# Patient Record
(demographics unavailable — no encounter records)

---

## 2025-01-14 NOTE — IMAGING
[Bilateral] : knee bilaterally [All Views] : anteroposterior, lateral, skyline, and anteroposterior standing [Degenerative change] : Degenerative change [FreeTextEntry9] : moderate oa Left > right. hardware intact left knee.

## 2025-01-14 NOTE — REVIEW OF SYSTEMS
[Joint Pain] : joint pain [Negative] : Heme/Lymph [Joint Stiffness] : joint stiffness [Muscle Weakness] : muscle weakness

## 2025-01-14 NOTE — DISCUSSION/SUMMARY
[Medication Risks Reviewed] : Medication risks reviewed [de-identified] : General Dx Discussion The patient was advised of the diagnosis. The natural history of the pathology was explained in full to the patient in layman's terms. All questions were answered. The risks and benefits of surgical and non-surgical treatment alternatives were explained in full to the patient.  Case Discussed. She did well with Visco injections in the past therefore recommend she repeat them. Recommend and request authorization for Euflexxa injections b/l knees.  f/u once approved.  Other otions for the future discussed including TKA     Entered by Maryuri RIOS acting as a scribe. Instructions: Dr. Marcus- The documentation recorded by the scribe accurately reflects the service I personally performed and the decisions made by me.

## 2025-01-14 NOTE — PHYSICAL EXAM
[Right] : right knee [Left] : left knee [NL (0)] : extension 0 degrees [5___] : hamstring 5[unfilled]/5 [] : no erythema [de-identified] : AP instability  [TWNoteComboBox7] : flexion 120 degrees

## 2025-01-14 NOTE — HISTORY OF PRESENT ILLNESS
[Gradual] : gradual [10] : 10 [7] : 7 [Localized] : localized [Injection therapy] : injection therapy [Retired] : Work status: retired [de-identified] : Patient here for further evaluation of cira knee pain. She c/o pain for many years. No injury or trauma. s/p PCL reconstruction many years ago. She has been treated with synvisc and Gel One injections in 2022. No catching or locking. Pain with walking and stairs.  [] : no [FreeTextEntry1] : knees [FreeTextEntry5] : chronic knee pain , no recent injury, was seeing another dr in [employer] and was getting injections and has had sx in the past  [FreeTextEntry9] : Aleve [de-identified] : bending down  [de-identified] : another ortho  [de-identified] : l knee

## 2025-01-27 NOTE — HISTORY OF PRESENT ILLNESS
[Lower back] : lower back [9] : 9 [7] : 7 [Dull/Aching] : dull/aching [Constant] : constant [Leisure] : leisure [Sleep] : sleep [Meds] : meds [Standing] : standing [Walking] : walking [Bending forward] : bending forward [Exercising] : exercising [Lying in bed] : lying in bed [FreeTextEntry1] : 2025 - Patient presents for reevaluation. Patient was last seen about 10 months ago.  Since her previous visit she has undergone a neurosurgical evaluation with Dr. John Mir.  Further conservative care was recommended and she was referred back to our practice.  Patient reports her low back pain and right hip pain remains most bothersome. Denies radicular symptoms or grain pain at this time. Low back pain is present when transitioning from sit to stand and when lifting. Pain wishes to discuss facet injections today vs BVN ablation which was previously denied by her insurance.    3/25/2024 - Patient presents for follow-up visit after right L4-L5, L5-S1 TFESI on 3/8/2024.  She reports a significant reduction of pain s/p epidural (80% overall reduction of pain), her right hip pain has been greatly reduced.  She has started formal PT. Patient still has some residual lower back and right buttock pain.   2024 - Patient presents for FUV after a right L4-5 TFESI on 2024. Patient reports a 75% reduction of pain s/p epidural, her pain is currently at the right buttock, hip and upper thigh, pain quality has dropped from sharp to aching.  Patient reports consideration for BVN ablation has been declined by insurance carrier.  2024 - The patient presents for initial evaluation regarding their low back pain. Patient was referred by Dr. Raymundo. Patient has a long-standing history of chronic lower back pain, since 2018 she has been feeling radicular pain to her hip. Currently her pain is across the lower back with radiation to the right hip and buttocks (Pain Distribution: 65% right hip, 35% lower back). She has tried PT, HEP, previous epidurals and facet blocks with no meaningful benefit; her pain is exacerbated with prolonged sitting, lifting and bending.    Injections: 1) Right L4-5 TFESI (2024) 2) Right L4-L5, L5-S1 TFESI (3/8/2024) 3) right GTB injection (2023) - Dr. Raymundo  Pertinent Surgical History: N/A   Imagin) MRI Lumbar Spine (12/10/2024) - [facility]  There is degenerative appearing endplate fatty change with Schmorl's nodes at L4-L5 (Modic type II2 change).  There is minimal degenerative appearing endplate fatty change with Schmorl's nodes anteriorly at T12-L1 as well.  Paraspinal soft tissues unremarkable. L1-L2: Central annular fissure with broad-based disc protrusion mildly narrowing the spinal canal. No foramina stenosis L2-L3: Mild disc bulge and facet arthrosis. Left foraminal zone annular fissure with disc protrusion. Mild left foraminal narrowing with impingement of the exiting left L2 nerve root. No spinal canal or right foraminal narrowing. L3-L4: Mild disc bulge. Mild facet arthrosis. No spinal canal or foramina stenosis L4-L5: Disc bulge. Moderate facet arthrosis. Left paracentral annular fissure with broad-based disc protrusion. Mild spinal canal and foraminal narrowing with disc material contacting exiting right L4 nerve root as well as bilateral descending L5 nerve roots L5-S1: Mild facet arthrosis. No disc bulge, spinal canal or foraminal narrowing  Physician Disclaimer: I have personally reviewed and confirmed all HPI data with the patient.  [] : no [FreeTextEntry7] : rt buttock  [de-identified] : eamon

## 2025-01-27 NOTE — PHYSICAL EXAM
[de-identified] : Constitutional:   - No acute distress   - Well developed; well nourished    Neurological:   - normal mood and affect   - alert and oriented x 3     Cardiovascular:   - grossly normal   Lumbar Spine Exam:   Inspection:  erythema (-)  ecchymosis (-)  rashes (-)  alignment: no scoliosis   Palpation:  Midline lumbar tenderness:            (-)  midline thoracic tenderness:          (-)  Lumbar paraspinal tenderness:  L (-) ; R (-)  thoracic paraspinal tenderness: L (-) ; R (-)  sciatic nerve tenderness :          L (-) ; R (-)  SI joint tenderness:                     L (-) ; R (-)  GTB tenderness:                        L (-);  R (-)   ROM: WNL  pain with flexion > extension  Strength:                                     Right       Left     Hip Flexion:                (5/5)       (5/5)  Quadriceps:               (5/5)       (5/5)  Hamstrings:                (5/5)       (5/5)  Ankle Dorsiflexion:     (5/5)       (5/5)  EHL:                           (5/5)       (5/5)  Ankle Plantarflexion:  (5/5)       (5/5)   Special Tests:  SLR:                            R (=) ; L (-)  Facet loading:             R (+) ; L (+)  DANIELLA test:                R (n/a) ; L (n/a)  Hamstring tightness:   R (-);  L (-)   Neurologic:  SILT throughout right lower extremity  SILT throughout left lower extremity   Reflexes normal and symmetric bilateral lower extremities   Gait:  non- antalgic gait

## 2025-01-27 NOTE — ASSESSMENT
[FreeTextEntry1] : A discussion regarding available pain management treatment options occurred with the patient.  These included interventional, rehabilitative, pharmacological, and alternative modalities. We will proceed with the following:    Interventional treatment options:   - Proceed with bilateral L4-L5, L5-S1 facet joint MBB with fluoroscopic guidance - Proceed with L4-L5 BVN ablation (Intracept procedure) with fluoroscopic guidance - Can consider repeat right L4-L5, L5-S1 TFESI with return of severe radicular pain - Failed right GTB injection with orthopedics - see additional instructions below    Rehabilitative options:   - Continue physical therapy - participation in active HEP was discussed    Medication based treatment options:   - continue OTC NSAIDs on as-needed basis - see additional instructions below    Complementary treatment options:   - lifestyle modifications discussed  Additional treatment recommendations as follows: - Follow up 1-2 weeks post injection for assessment of efficacy and further treatment recommendations                                                                                      ****Request authorization/schedule Intracept at (L4, L5)****   The risks, benefits, and alternatives to the Intracept procedure were discussed with the patient, and they understand and wish to proceed, there are no contraindications to the performance of the procedure. The patient has had LBP for more than 12 months and has failed multiple structured attempts to resolve the pain for more than 6 months, including (physical therapy, physician-directed home exercise program, lifestyle modification, posture correction, biomechanics education, NSAIDs, chiropractic care, acupuncture, massage therapy, medication).    The MRI demonstrates Modic degenerative endplate changes at (L4, L5). The patient has radiographic evidence of other pathologies besides the Modic changes, however none of them are the predominant cause of the patient's complaints.  Attempts at treating other conditions have not resolved their axial LBP, as such, the predominant cause is due to inflammation of the basivertebral nerve causing vertebrogenic LBP.  The patient's predominant physical complaint is due to Modic changes. The patient's other radiographic findings are not pertinent.   The patient has undergone careful screening by a multi-disciplinary team that includes, the (PCP, the physical therapist, chiropractor, massage therapist, our advanced practice providers) in addition to me.  The patient underwent psychological screening by me and there is no evidence of untreated substance abuse disorder.  The risks, benefits and alternatives of the proposed procedure were explained in detail with the patient. The risks outlined include but are not limited to infection, bleeding, post- dural puncture headache, nerve injury, a temporary increase in pain, failure to resolve symptoms, need for future interventions, allergic reaction, and possible elevation of blood sugar in diabetics if using corticosteroid.  All questions were answered to patient's apparent satisfaction, and he/she verbalized an understanding.  We have discussed the risks, benefits, and alternatives for NSAID therapy including but not limited to the risk of bleeding, thrombosis, gastric mucosal irritation/ulceration, allergic reaction and kidney dysfunction.  The patient verbalizes an understanding.  The documentation recorded by the scribe, in my presence, accurately reflects the service I personally performed and the decisions made by me with my edits as appropriate.   I, Heidy Patrick NP, acting as scribe, attest that this documentation has been prepared under the direction and in the presence of Provider Theron Montero DO.

## 2025-01-27 NOTE — HISTORY OF PRESENT ILLNESS
[Lower back] : lower back [9] : 9 [7] : 7 [Dull/Aching] : dull/aching [Constant] : constant [Leisure] : leisure [Sleep] : sleep [Meds] : meds [Standing] : standing [Walking] : walking [Bending forward] : bending forward [Exercising] : exercising [Lying in bed] : lying in bed [FreeTextEntry1] : 2025 - Patient presents for reevaluation. Patient was last seen about 10 months ago.  Since her previous visit she has undergone a neurosurgical evaluation with Dr. John Mir.  Further conservative care was recommended and she was referred back to our practice.  Patient reports her low back pain and right hip pain remains most bothersome. Denies radicular symptoms or grain pain at this time. Low back pain is present when transitioning from sit to stand and when lifting. Pain wishes to discuss facet injections today vs BVN ablation which was previously denied by her insurance.    3/25/2024 - Patient presents for follow-up visit after right L4-L5, L5-S1 TFESI on 3/8/2024.  She reports a significant reduction of pain s/p epidural (80% overall reduction of pain), her right hip pain has been greatly reduced.  She has started formal PT. Patient still has some residual lower back and right buttock pain.   2024 - Patient presents for FUV after a right L4-5 TFESI on 2024. Patient reports a 75% reduction of pain s/p epidural, her pain is currently at the right buttock, hip and upper thigh, pain quality has dropped from sharp to aching.  Patient reports consideration for BVN ablation has been declined by insurance carrier.  2024 - The patient presents for initial evaluation regarding their low back pain. Patient was referred by Dr. Raymundo. Patient has a long-standing history of chronic lower back pain, since 2018 she has been feeling radicular pain to her hip. Currently her pain is across the lower back with radiation to the right hip and buttocks (Pain Distribution: 65% right hip, 35% lower back). She has tried PT, HEP, previous epidurals and facet blocks with no meaningful benefit; her pain is exacerbated with prolonged sitting, lifting and bending.    Injections: 1) Right L4-5 TFESI (2024) 2) Right L4-L5, L5-S1 TFESI (3/8/2024) 3) right GTB injection (2023) - Dr. Raymundo  Pertinent Surgical History: N/A   Imagin) MRI Lumbar Spine (12/10/2024) - [facility]  There is degenerative appearing endplate fatty change with Schmorl's nodes at L4-L5 (Modic type II2 change).  There is minimal degenerative appearing endplate fatty change with Schmorl's nodes anteriorly at T12-L1 as well.  Paraspinal soft tissues unremarkable. L1-L2: Central annular fissure with broad-based disc protrusion mildly narrowing the spinal canal. No foramina stenosis L2-L3: Mild disc bulge and facet arthrosis. Left foraminal zone annular fissure with disc protrusion. Mild left foraminal narrowing with impingement of the exiting left L2 nerve root. No spinal canal or right foraminal narrowing. L3-L4: Mild disc bulge. Mild facet arthrosis. No spinal canal or foramina stenosis L4-L5: Disc bulge. Moderate facet arthrosis. Left paracentral annular fissure with broad-based disc protrusion. Mild spinal canal and foraminal narrowing with disc material contacting exiting right L4 nerve root as well as bilateral descending L5 nerve roots L5-S1: Mild facet arthrosis. No disc bulge, spinal canal or foraminal narrowing  Physician Disclaimer: I have personally reviewed and confirmed all HPI data with the patient.  [] : no [FreeTextEntry7] : rt buttock  [de-identified] : eamon

## 2025-01-27 NOTE — PHYSICAL EXAM
[de-identified] : Constitutional:   - No acute distress   - Well developed; well nourished    Neurological:   - normal mood and affect   - alert and oriented x 3     Cardiovascular:   - grossly normal   Lumbar Spine Exam:   Inspection:  erythema (-)  ecchymosis (-)  rashes (-)  alignment: no scoliosis   Palpation:  Midline lumbar tenderness:            (-)  midline thoracic tenderness:          (-)  Lumbar paraspinal tenderness:  L (-) ; R (-)  thoracic paraspinal tenderness: L (-) ; R (-)  sciatic nerve tenderness :          L (-) ; R (-)  SI joint tenderness:                     L (-) ; R (-)  GTB tenderness:                        L (-);  R (-)   ROM: WNL  pain with flexion > extension  Strength:                                     Right       Left     Hip Flexion:                (5/5)       (5/5)  Quadriceps:               (5/5)       (5/5)  Hamstrings:                (5/5)       (5/5)  Ankle Dorsiflexion:     (5/5)       (5/5)  EHL:                           (5/5)       (5/5)  Ankle Plantarflexion:  (5/5)       (5/5)   Special Tests:  SLR:                            R (=) ; L (-)  Facet loading:             R (+) ; L (+)  DANIELLA test:                R (n/a) ; L (n/a)  Hamstring tightness:   R (-);  L (-)   Neurologic:  SILT throughout right lower extremity  SILT throughout left lower extremity   Reflexes normal and symmetric bilateral lower extremities   Gait:  non- antalgic gait

## 2025-02-14 NOTE — PROCEDURE
[FreeTextEntry3] : Date of Service: 02/14/2025   Account: [id number]  Patient: AMBER SWEET   YOB: 1972  Age: 52 year  Surgeon:                  Theron Montero DO  Assistant:                None  Pre-Operative Diagnosis:   Lumbar Spondylosis      Post Operative Diagnosis:  Lumbar Spondylosis  Procedure:             Bilateral L4-5, L5-S1 facet block under fluoroscopic guidance.  Anesthesia:            MAC  This procedure was carried out using fluoroscopic guidance.  The risks and benefits of the procedure were discussed extensively with the patient.  The consent of the patient was obtained and the following procedure was performed. The patient was placed in the prone position on the fluoroscopy table and the area was prepped and draped in a sterile fashion.  A timeout was performed with all essential staff present and the site and side were verified.  The lumbar vertebral bodies were identified and the fluoroscope was obliqued ipsilateral to approximately 30 degrees to reveal the appropriate anatomical view.  The junction of the superior articulate process and transverse process at the right L4 and L5 levels were identified and marked.   The skin at these target points was then localized using 1 cc of 1% Lidocaine at each injection site.  A spinal needle was then introduced and advanced to the above target points at the junction of the SAP and transverse processes until bone was contacted.  Fluoroscope then focused on the right sacral ala on A/P view and marked at this point.  The skin and subcutaneous structures were localized using 1cc of 1.0 % lidocaine.  A spinal needle was then advanced under fluoroscopic guidance until bone was contacted at the ala.    After negative aspiration for heme and CSF 1 cc of 0.5% Marcaine was injected at each of the injection sites.  The procedure was performed in the exact same fashion on the contralateral left side at the L4, L5 and sacral ala levels.  Vital signs remained normal throughout the procedure.  The patient tolerated the procedure well.  There were no immediate complications from the performed procedure.  The patient was instructed to apply ice over the injection sites for twenty minutes every two hours for the next 24 hours.  Disposition:      1. The patient was advised to F/U in 1-2 weeks to assess the response to the injection.       2. They were advised to keep a pain diary to report the results of the diagnostic block at their FUV.      3. The patient was also instructed to contact me immediately if there were any concerns related to the procedure performed.

## 2025-03-10 NOTE — PHYSICAL EXAM
[de-identified] : Constitutional:   - No acute distress   - Well developed; well nourished    Neurological:   - normal mood and affect   - alert and oriented x 3     Cardiovascular:   - grossly normal   Lumbar Spine Exam:   Inspection:  erythema (-)  ecchymosis (-)  rashes (-)  alignment: no scoliosis   Palpation:  Midline lumbar tenderness:            (-)  midline thoracic tenderness:          (-)  Lumbar paraspinal tenderness:  L (-) ; R (-)  thoracic paraspinal tenderness: L (-) ; R (-)  sciatic nerve tenderness :          L (-) ; R (-)  SI joint tenderness:                     L (-) ; R (-)  GTB tenderness:                        L (-);  R (-)   ROM: WNL  pain with flexion > extension  Strength:                                     Right       Left     Hip Flexion:                (5/5)       (5/5)  Quadriceps:               (5/5)       (5/5)  Hamstrings:                (5/5)       (5/5)  Ankle Dorsiflexion:     (5/5)       (5/5)  EHL:                           (5/5)       (5/5)  Ankle Plantarflexion:  (5/5)       (5/5)   Special Tests:  SLR:                            R (=) ; L (-)  Facet loading:             R (+) ; L (+)  DANIELLA test:                R (n/a) ; L (n/a)  Hamstring tightness:   R (-);  L (-)   Neurologic:  SILT throughout right lower extremity  SILT throughout left lower extremity   Reflexes normal and symmetric bilateral lower extremities   Gait:  non- antalgic gait

## 2025-03-10 NOTE — ASSESSMENT
[FreeTextEntry1] : A discussion regarding available pain management treatment options occurred with the patient.  These included interventional, rehabilitative, pharmacological, and alternative modalities. We will proceed with the following:    Interventional treatment options:   - Proceed with L4-L5 BVN ablation (Intracept procedure) with fluoroscopic guidance; , scheduled 4/17/25 - May proceed with repeat bilateral L4-L5, L5-S1 facet joint MBB with ongoing axial low back pain - Can consider repeat right L4-L5, L5-S1 TFESI with return of severe radicular pain - Failed right GTB injection with orthopedics - see additional instructions below    Rehabilitative options:   - Continue physical therapy - participation in active HEP was discussed    Medication based treatment options:   - continue OTC NSAIDs on as-needed basis - see additional instructions below    Complementary treatment options:   - lifestyle modifications discussed  Additional treatment recommendations as follows: - Follow up 1-2 weeks post injection for assessment of efficacy and further treatment recommendations                                                                                      ****Request authorization/schedule Intracept at L4, L5****   The risks, benefits, and alternatives to the Intracept procedure were discussed with the patient, and they understand and wish to proceed, there are no contraindications to the performance of the procedure. The patient has had LBP for more than 12 months and has failed multiple structured attempts to resolve the pain for more than 6 months, including physical therapy, physician-directed home exercise program, lifestyle modification, posture correction, biomechanics education, NSAIDs, chiropractic care, acupuncture, massage therapy, medication.    The MRI demonstrates Modic degenerative endplate changes at L4, L5. The patient has radiographic evidence of other pathologies besides the Modic changes, however none of them are the predominant cause of the patient's complaints.  Attempts at treating other conditions have not resolved their axial LBP, as such, the predominant cause is due to inflammation of the basivertebral nerve causing vertebrogenic LBP.  The patient's predominant physical complaint is due to Modic changes. The patient's other radiographic findings are not pertinent.   The patient has undergone careful screening by a multi-disciplinary team that includes, the (PCP, the physical therapist, chiropractor, massage therapist, our advanced practice providers) in addition to me.  The patient underwent psychological screening by me and there is no evidence of untreated substance abuse disorder.  The risks, benefits and alternatives of the proposed procedure were explained in detail with the patient. The risks outlined include but are not limited to infection, bleeding, post- dural puncture headache, nerve injury, a temporary increase in pain, failure to resolve symptoms, need for future interventions, allergic reaction, and possible elevation of blood sugar in diabetics if using corticosteroid.  All questions were answered to patient's apparent satisfaction, and he/she verbalized an understanding.  We have discussed the risks, benefits, and alternatives for NSAID therapy including but not limited to the risk of bleeding, thrombosis, gastric mucosal irritation/ulceration, allergic reaction and kidney dysfunction.  The patient verbalizes an understanding.  The documentation recorded by the scribe, in my presence, accurately reflects the service I personally performed and the decisions made by me with my edits as appropriate.   I, Heidy Patrick NP, acting as scribe, attest that this documentation has been prepared under the direction and in the presence of Provider Theron Montero DO.

## 2025-03-10 NOTE — PHYSICAL EXAM
[de-identified] : Constitutional:   - No acute distress   - Well developed; well nourished    Neurological:   - normal mood and affect   - alert and oriented x 3     Cardiovascular:   - grossly normal   Lumbar Spine Exam:   Inspection:  erythema (-)  ecchymosis (-)  rashes (-)  alignment: no scoliosis   Palpation:  Midline lumbar tenderness:            (-)  midline thoracic tenderness:          (-)  Lumbar paraspinal tenderness:  L (-) ; R (-)  thoracic paraspinal tenderness: L (-) ; R (-)  sciatic nerve tenderness :          L (-) ; R (-)  SI joint tenderness:                     L (-) ; R (-)  GTB tenderness:                        L (-);  R (-)   ROM: WNL  pain with flexion > extension  Strength:                                     Right       Left     Hip Flexion:                (5/5)       (5/5)  Quadriceps:               (5/5)       (5/5)  Hamstrings:                (5/5)       (5/5)  Ankle Dorsiflexion:     (5/5)       (5/5)  EHL:                           (5/5)       (5/5)  Ankle Plantarflexion:  (5/5)       (5/5)   Special Tests:  SLR:                            R (=) ; L (-)  Facet loading:             R (+) ; L (+)  DANIELLA test:                R (n/a) ; L (n/a)  Hamstring tightness:   R (-);  L (-)   Neurologic:  SILT throughout right lower extremity  SILT throughout left lower extremity   Reflexes normal and symmetric bilateral lower extremities   Gait:  non- antalgic gait

## 2025-03-10 NOTE — HISTORY OF PRESENT ILLNESS
[Lower back] : lower back [3] : 3 [0] : 0 [Dull/Aching] : dull/aching [Intermittent] : intermittent [Household chores] : household chores [Leisure] : leisure [Injection therapy] : injection therapy [Sitting] : sitting [Standing] : standing [FreeTextEntry1] : 3/10/2025 - Patient presents for FUV after a bilateral L4-5, L5-S1 facet joint MBB on 2025. Patient reports 80% relief of axial low back pain the day of the procedure with a gradual return to baseline the following day.  Patient had a positive response with a diagnostic block for her posterior column low back pain and wishes to proceed with a repeat.  Of note, patient is scheduled for BVN ablation for treatment of anterior column low back pain 25 and is looking forward to it.  Denies any alarm signs or changes in medical history.    2025 - Patient presents for reevaluation. Patient was last seen about 10 months ago.  Since her previous visit she has undergone a neurosurgical evaluation with Dr. John Mir.  Further conservative care was recommended and she was referred back to our practice.  Patient reports her low back pain and right hip pain remains most bothersome. Denies radicular symptoms or grain pain at this time. Low back pain is present when transitioning from sit to stand and when lifting. Pain wishes to discuss facet injections today vs BVN ablation which was previously denied by her insurance.    3/25/2024 - Patient presents for follow-up visit after right L4-L5, L5-S1 TFESI on 3/8/2024.  She reports a significant reduction of pain s/p epidural (80% overall reduction of pain), her right hip pain has been greatly reduced.  She has started formal PT. Patient still has some residual lower back and right buttock pain.   2024 - Patient presents for FUV after a right L4-5 TFESI on 2024. Patient reports a 75% reduction of pain s/p epidural, her pain is currently at the right buttock, hip and upper thigh, pain quality has dropped from sharp to aching.  Patient reports consideration for BVN ablation has been declined by insurance carrier.  2024 - The patient presents for initial evaluation regarding their low back pain. Patient was referred by Dr. Raymundo. Patient has a long-standing history of chronic lower back pain, since 2018 she has been feeling radicular pain to her hip. Currently her pain is across the lower back with radiation to the right hip and buttocks (Pain Distribution: 65% right hip, 35% lower back). She has tried PT, HEP, previous epidurals and facet blocks with no meaningful benefit; her pain is exacerbated with prolonged sitting, lifting and bending.    Injections: 1) Right L4-5 TFESI (2024) 2) Right L4-L5, L5-S1 TFESI (3/8/2024) 3) right GTB injection (2023) - Dr. Raymundo 4) Bilateral L4-5, L5-S1 facet joint MBB (2025)  Pertinent Surgical History: N/A   Imagin) MRI Lumbar Spine (12/10/2024) - [facility]  There is degenerative appearing endplate fatty change with Schmorl's nodes at L4-L5 (Modic type II2 change).  There is minimal degenerative appearing endplate fatty change with Schmorl's nodes anteriorly at T12-L1 as well.  Paraspinal soft tissues unremarkable. L1-L2: Central annular fissure with broad-based disc protrusion mildly narrowing the spinal canal. No foramina stenosis L2-L3: Mild disc bulge and facet arthrosis. Left foraminal zone annular fissure with disc protrusion. Mild left foraminal narrowing with impingement of the exiting left L2 nerve root. No spinal canal or right foraminal narrowing. L3-L4: Mild disc bulge. Mild facet arthrosis. No spinal canal or foramina stenosis L4-L5: Disc bulge. Moderate facet arthrosis. Left paracentral annular fissure with broad-based disc protrusion. Mild spinal canal and foraminal narrowing with disc material contacting exiting right L4 nerve root as well as bilateral descending L5 nerve roots L5-S1: Mild facet arthrosis. No disc bulge, spinal canal or foraminal narrowing  Physician Disclaimer: I have personally reviewed and confirmed all HPI data with the patient.  [] : Post Surgical Visit: no [de-identified] : Lifting  [de-identified] : L MRI AT

## 2025-03-10 NOTE — HISTORY OF PRESENT ILLNESS
[Lower back] : lower back [3] : 3 [0] : 0 [Dull/Aching] : dull/aching [Intermittent] : intermittent [Household chores] : household chores [Leisure] : leisure [Injection therapy] : injection therapy [Sitting] : sitting [Standing] : standing [FreeTextEntry1] : 3/10/2025 - Patient presents for FUV after a bilateral L4-5, L5-S1 facet joint MBB on 2025. Patient reports 80% relief of axial low back pain the day of the procedure with a gradual return to baseline the following day.  Patient had a positive response with a diagnostic block for her posterior column low back pain and wishes to proceed with a repeat.  Of note, patient is scheduled for BVN ablation for treatment of anterior column low back pain 25 and is looking forward to it.  Denies any alarm signs or changes in medical history.    2025 - Patient presents for reevaluation. Patient was last seen about 10 months ago.  Since her previous visit she has undergone a neurosurgical evaluation with Dr. John Mir.  Further conservative care was recommended and she was referred back to our practice.  Patient reports her low back pain and right hip pain remains most bothersome. Denies radicular symptoms or grain pain at this time. Low back pain is present when transitioning from sit to stand and when lifting. Pain wishes to discuss facet injections today vs BVN ablation which was previously denied by her insurance.    3/25/2024 - Patient presents for follow-up visit after right L4-L5, L5-S1 TFESI on 3/8/2024.  She reports a significant reduction of pain s/p epidural (80% overall reduction of pain), her right hip pain has been greatly reduced.  She has started formal PT. Patient still has some residual lower back and right buttock pain.   2024 - Patient presents for FUV after a right L4-5 TFESI on 2024. Patient reports a 75% reduction of pain s/p epidural, her pain is currently at the right buttock, hip and upper thigh, pain quality has dropped from sharp to aching.  Patient reports consideration for BVN ablation has been declined by insurance carrier.  2024 - The patient presents for initial evaluation regarding their low back pain. Patient was referred by Dr. Raymundo. Patient has a long-standing history of chronic lower back pain, since 2018 she has been feeling radicular pain to her hip. Currently her pain is across the lower back with radiation to the right hip and buttocks (Pain Distribution: 65% right hip, 35% lower back). She has tried PT, HEP, previous epidurals and facet blocks with no meaningful benefit; her pain is exacerbated with prolonged sitting, lifting and bending.    Injections: 1) Right L4-5 TFESI (2024) 2) Right L4-L5, L5-S1 TFESI (3/8/2024) 3) right GTB injection (2023) - Dr. Raymundo 4) Bilateral L4-5, L5-S1 facet joint MBB (2025)  Pertinent Surgical History: N/A   Imagin) MRI Lumbar Spine (12/10/2024) - [facility]  There is degenerative appearing endplate fatty change with Schmorl's nodes at L4-L5 (Modic type II2 change).  There is minimal degenerative appearing endplate fatty change with Schmorl's nodes anteriorly at T12-L1 as well.  Paraspinal soft tissues unremarkable. L1-L2: Central annular fissure with broad-based disc protrusion mildly narrowing the spinal canal. No foramina stenosis L2-L3: Mild disc bulge and facet arthrosis. Left foraminal zone annular fissure with disc protrusion. Mild left foraminal narrowing with impingement of the exiting left L2 nerve root. No spinal canal or right foraminal narrowing. L3-L4: Mild disc bulge. Mild facet arthrosis. No spinal canal or foramina stenosis L4-L5: Disc bulge. Moderate facet arthrosis. Left paracentral annular fissure with broad-based disc protrusion. Mild spinal canal and foraminal narrowing with disc material contacting exiting right L4 nerve root as well as bilateral descending L5 nerve roots L5-S1: Mild facet arthrosis. No disc bulge, spinal canal or foraminal narrowing  Physician Disclaimer: I have personally reviewed and confirmed all HPI data with the patient.  [] : Post Surgical Visit: no [de-identified] : Lifting  [de-identified] : L MRI AT

## 2025-04-28 NOTE — HISTORY OF PRESENT ILLNESS
[Lower back] : lower back [4] : 4 [2] : 2 [Dull/Aching] : dull/aching [Intermittent] : intermittent [Household chores] : household chores [Leisure] : leisure [Ice] : ice [Heat] : heat [Injection therapy] : injection therapy [FreeTextEntry1] : 2025 - Patient presents today for a FUV after L4-L5 BVN Ablation (Intracept) procedure on 2025, Patient reports overall 80% reduction in axial low back pain since the procedure and she is very pleased with the results at this time.  She reports her sleep has improved significantly as well as her discomfort when transitioning from sit to stand positioning or getting in/out of her car. Patient reports still remaining cautious with bending/lifting and therefore is unable to gauge her relief of symptoms fully at this time.  Patient is looking forward to beginning physical therapy and engaging in physical activity again. Presents today for wound check and to discuss next steps in her treatment plan.    3/10/2025 - Patient presents for FUV after a bilateral L4-5, L5-S1 facet joint MBB on 2025. Patient reports 80% relief of axial low back pain the day of the procedure with a gradual return to baseline the following day.  Patient had a positive response with a diagnostic block for her posterior column low back pain and wishes to proceed with a repeat.  Of note, patient is scheduled for BVN ablation for treatment of anterior column low back pain 25 and is looking forward to it.  Denies any alarm signs or changes in medical history.    2025 - Patient presents for reevaluation. Patient was last seen about 10 months ago.  Since her previous visit she has undergone a neurosurgical evaluation with Dr. John Mir.  Further conservative care was recommended and she was referred back to our practice.  Patient reports her low back pain and right hip pain remains most bothersome. Denies radicular symptoms or grain pain at this time. Low back pain is present when transitioning from sit to stand and when lifting. Pain wishes to discuss facet injections today vs BVN ablation which was previously denied by her insurance.    3/25/2024 - Patient presents for follow-up visit after right L4-L5, L5-S1 TFESI on 3/8/2024.  She reports a significant reduction of pain s/p epidural (80% overall reduction of pain), her right hip pain has been greatly reduced.  She has started formal PT. Patient still has some residual lower back and right buttock pain.   2024 - Patient presents for FUV after a right L4-5 TFESI on 2024. Patient reports a 75% reduction of pain s/p epidural, her pain is currently at the right buttock, hip and upper thigh, pain quality has dropped from sharp to aching.  Patient reports consideration for BVN ablation has been declined by insurance carrier.  2024 - The patient presents for initial evaluation regarding their low back pain. Patient was referred by Dr. Raymundo. Patient has a long-standing history of chronic lower back pain, since 2018 she has been feeling radicular pain to her hip. Currently her pain is across the lower back with radiation to the right hip and buttocks (Pain Distribution: 65% right hip, 35% lower back). She has tried PT, HEP, previous epidurals and facet blocks with no meaningful benefit; her pain is exacerbated with prolonged sitting, lifting and bending.    Injections: 1) Right L4-5 TFESI (2024) 2) Right L4-L5, L5-S1 TFESI (3/8/2024) 3) right GTB injection (2023) - Dr. Raymundo 4) Bilateral L4-5, L5-S1 facet joint MBB (2025) 5) BVN Ablation (Intracept) L4-L5 Levels (2025)  Pertinent Surgical History: N/A   Imagin) MRI Lumbar Spine (12/10/2024) - [facility]  There is degenerative appearing endplate fatty change with Schmorl's nodes at L4-L5 (Modic type II2 change).  There is minimal degenerative appearing endplate fatty change with Schmorl's nodes anteriorly at T12-L1 as well.  Paraspinal soft tissues unremarkable. L1-L2: Central annular fissure with broad-based disc protrusion mildly narrowing the spinal canal. No foramina stenosis L2-L3: Mild disc bulge and facet arthrosis. Left foraminal zone annular fissure with disc protrusion. Mild left foraminal narrowing with impingement of the exiting left L2 nerve root. No spinal canal or right foraminal narrowing. L3-L4: Mild disc bulge. Mild facet arthrosis. No spinal canal or foramina stenosis L4-L5: Disc bulge. Moderate facet arthrosis. Left paracentral annular fissure with broad-based disc protrusion. Mild spinal canal and foraminal narrowing with disc material contacting exiting right L4 nerve root as well as bilateral descending L5 nerve roots L5-S1: Mild facet arthrosis. No disc bulge, spinal canal or foraminal narrowing  Physician Disclaimer: I have personally reviewed and confirmed all HPI data with the patient.  [] : Post Surgical Visit: no [de-identified] : No triggers since injection  [de-identified] : L MRI AT

## 2025-04-28 NOTE — ASSESSMENT
[FreeTextEntry1] : A discussion regarding available pain management treatment options occurred with the patient.  These included interventional, rehabilitative, pharmacological, and alternative modalities. We will proceed with the following:    Interventional treatment options:   - None indicated at this time - Patient s/p L4-L5 BVN ablation (Intracept procedure); doing well - May proceed with repeat bilateral L4-L5, L5-S1 facet joint MBB with ongoing axial low back pain - Can consider repeat right L4-L5, L5-S1 TFESI with return of severe radicular pain - Failed right GTB injection with orthopedics - see additional instructions below    Rehabilitative options:   - Initiate physical therapy s/p Intracept procedure; referral provided today - participation in active HEP was discussed    Medication based treatment options:   - continue OTC NSAIDs on as-needed basis - see additional instructions below    Complementary treatment options:   - lifestyle modifications discussed  Additional treatment recommendations as follows: - Follow up 6-8 weeks or as needed basis                             We have discussed the risks, benefits, and alternatives for NSAID therapy including but not limited to the risk of bleeding, thrombosis, gastric mucosal irritation/ulceration, allergic reaction and kidney dysfunction.  The patient verbalizes an understanding.  The documentation recorded by the scribe, in my presence, accurately reflects the service I personally performed and the decisions made by me with my edits as appropriate.   I, Heidy Patrick NP, acting as scribe, attest that this documentation has been prepared under the direction and in the presence of Provider Theron Montero DO.

## 2025-04-28 NOTE — HISTORY OF PRESENT ILLNESS
[Lower back] : lower back [4] : 4 [2] : 2 [Dull/Aching] : dull/aching 24-Jun-2020 09:20 [Intermittent] : intermittent [Household chores] : household chores [Leisure] : leisure [Ice] : ice [Heat] : heat [Injection therapy] : injection therapy [FreeTextEntry1] : 2025 - Patient presents today for a FUV after L4-L5 BVN Ablation (Intracept) procedure on 2025, Patient reports overall 80% reduction in axial low back pain since the procedure and she is very pleased with the results at this time.  She reports her sleep has improved significantly as well as her discomfort when transitioning from sit to stand positioning or getting in/out of her car. Patient reports still remaining cautious with bending/lifting and therefore is unable to gauge her relief of symptoms fully at this time.  Patient is looking forward to beginning physical therapy and engaging in physical activity again. Presents today for wound check and to discuss next steps in her treatment plan.    3/10/2025 - Patient presents for FUV after a bilateral L4-5, L5-S1 facet joint MBB on 2025. Patient reports 80% relief of axial low back pain the day of the procedure with a gradual return to baseline the following day.  Patient had a positive response with a diagnostic block for her posterior column low back pain and wishes to proceed with a repeat.  Of note, patient is scheduled for BVN ablation for treatment of anterior column low back pain 25 and is looking forward to it.  Denies any alarm signs or changes in medical history.    2025 - Patient presents for reevaluation. Patient was last seen about 10 months ago.  Since her previous visit she has undergone a neurosurgical evaluation with Dr. John Mir.  Further conservative care was recommended and she was referred back to our practice.  Patient reports her low back pain and right hip pain remains most bothersome. Denies radicular symptoms or grain pain at this time. Low back pain is present when transitioning from sit to stand and when lifting. Pain wishes to discuss facet injections today vs BVN ablation which was previously denied by her insurance.    3/25/2024 - Patient presents for follow-up visit after right L4-L5, L5-S1 TFESI on 3/8/2024.  She reports a significant reduction of pain s/p epidural (80% overall reduction of pain), her right hip pain has been greatly reduced.  She has started formal PT. Patient still has some residual lower back and right buttock pain.   2024 - Patient presents for FUV after a right L4-5 TFESI on 2024. Patient reports a 75% reduction of pain s/p epidural, her pain is currently at the right buttock, hip and upper thigh, pain quality has dropped from sharp to aching.  Patient reports consideration for BVN ablation has been declined by insurance carrier.  2024 - The patient presents for initial evaluation regarding their low back pain. Patient was referred by Dr. Raymundo. Patient has a long-standing history of chronic lower back pain, since 2018 she has been feeling radicular pain to her hip. Currently her pain is across the lower back with radiation to the right hip and buttocks (Pain Distribution: 65% right hip, 35% lower back). She has tried PT, HEP, previous epidurals and facet blocks with no meaningful benefit; her pain is exacerbated with prolonged sitting, lifting and bending.    Injections: 1) Right L4-5 TFESI (2024) 2) Right L4-L5, L5-S1 TFESI (3/8/2024) 3) right GTB injection (2023) - Dr. Raymundo 4) Bilateral L4-5, L5-S1 facet joint MBB (2025) 5) BVN Ablation (Intracept) L4-L5 Levels (2025)  Pertinent Surgical History: N/A   Imagin) MRI Lumbar Spine (12/10/2024) - [facility]  There is degenerative appearing endplate fatty change with Schmorl's nodes at L4-L5 (Modic type II2 change).  There is minimal degenerative appearing endplate fatty change with Schmorl's nodes anteriorly at T12-L1 as well.  Paraspinal soft tissues unremarkable. L1-L2: Central annular fissure with broad-based disc protrusion mildly narrowing the spinal canal. No foramina stenosis L2-L3: Mild disc bulge and facet arthrosis. Left foraminal zone annular fissure with disc protrusion. Mild left foraminal narrowing with impingement of the exiting left L2 nerve root. No spinal canal or right foraminal narrowing. L3-L4: Mild disc bulge. Mild facet arthrosis. No spinal canal or foramina stenosis L4-L5: Disc bulge. Moderate facet arthrosis. Left paracentral annular fissure with broad-based disc protrusion. Mild spinal canal and foraminal narrowing with disc material contacting exiting right L4 nerve root as well as bilateral descending L5 nerve roots L5-S1: Mild facet arthrosis. No disc bulge, spinal canal or foraminal narrowing  Physician Disclaimer: I have personally reviewed and confirmed all HPI data with the patient.  [] : Post Surgical Visit: no [de-identified] : No triggers since injection  [de-identified] : L MRI AT

## 2025-04-28 NOTE — PHYSICAL EXAM
[de-identified] : Constitutional:   - No acute distress   - Well developed; well nourished    Neurological:   - normal mood and affect   - alert and oriented x 3     Cardiovascular:   - grossly normal   Lumbar Spine Exam:   Inspection:  erythema (-)  ecchymosis (-)  rashes (-)  alignment: no scoliosis  BVN ablation trocar site CDI without redness, edema or exudate  Palpation:  Midline lumbar tenderness:            (-)  midline thoracic tenderness:          (-)  Lumbar paraspinal tenderness:  L (-) ; R (-)  thoracic paraspinal tenderness: L (-) ; R (-)  sciatic nerve tenderness :          L (-) ; R (-)  SI joint tenderness:                     L (-) ; R (-)  GTB tenderness:                        L (-);  R (-)   ROM: WNL  pain with extremes of flexion > extension  Strength:                                     Right       Left     Hip Flexion:                (5/5)       (5/5)  Quadriceps:               (5/5)       (5/5)  Hamstrings:                (5/5)       (5/5)  Ankle Dorsiflexion:     (5/5)       (5/5)  EHL:                           (5/5)       (5/5)  Ankle Plantarflexion:  (5/5)       (5/5)   Special Tests:  SLR:                            R (-) ; L (-)  Facet loading:             R (-) ; L (-)  DANIELLA test:                R (n/a) ; L (n/a)  Hamstring tightness:   R (-);  L (-)   Neurologic:  SILT throughout right lower extremity  SILT throughout left lower extremity   Reflexes normal and symmetric bilateral lower extremities   Gait:  non- antalgic gait

## 2025-04-28 NOTE — PHYSICAL EXAM
[de-identified] : Constitutional:   - No acute distress   - Well developed; well nourished    Neurological:   - normal mood and affect   - alert and oriented x 3     Cardiovascular:   - grossly normal   Lumbar Spine Exam:   Inspection:  erythema (-)  ecchymosis (-)  rashes (-)  alignment: no scoliosis  BVN ablation trocar site CDI without redness, edema or exudate  Palpation:  Midline lumbar tenderness:            (-)  midline thoracic tenderness:          (-)  Lumbar paraspinal tenderness:  L (-) ; R (-)  thoracic paraspinal tenderness: L (-) ; R (-)  sciatic nerve tenderness :          L (-) ; R (-)  SI joint tenderness:                     L (-) ; R (-)  GTB tenderness:                        L (-);  R (-)   ROM: WNL  pain with extremes of flexion > extension  Strength:                                     Right       Left     Hip Flexion:                (5/5)       (5/5)  Quadriceps:               (5/5)       (5/5)  Hamstrings:                (5/5)       (5/5)  Ankle Dorsiflexion:     (5/5)       (5/5)  EHL:                           (5/5)       (5/5)  Ankle Plantarflexion:  (5/5)       (5/5)   Special Tests:  SLR:                            R (-) ; L (-)  Facet loading:             R (-) ; L (-)  DANIELLA test:                R (n/a) ; L (n/a)  Hamstring tightness:   R (-);  L (-)   Neurologic:  SILT throughout right lower extremity  SILT throughout left lower extremity   Reflexes normal and symmetric bilateral lower extremities   Gait:  non- antalgic gait

## 2025-04-29 NOTE — DISCUSSION/SUMMARY
[Medication Risks Reviewed] : Medication risks reviewed [de-identified] : General Dx Discussion The patient was advised of the diagnosis. The natural history of the pathology was explained in full to the patient in layman's terms. All questions were answered. The risks and benefits of surgical and non-surgical treatment alternatives were explained in full to the patient.  Case Discussed. Visco3 injections today b/l knees and tolerated well.  Ice as needed. f/u 1 week to continue series.     Entered by Maryuri RIOS acting as a scribe. Instructions: Dr. Marcus- The documentation recorded by the scribe accurately reflects the service I personally performed and the decisions made by me.

## 2025-04-29 NOTE — PHYSICAL EXAM
[Right] : right knee [Left] : left knee [NL (0)] : extension 0 degrees [5___] : hamstring 5[unfilled]/5 [] : patient ambulates without assistive device [de-identified] : AP instability  [TWNoteComboBox7] : flexion 120 degrees

## 2025-04-29 NOTE — HISTORY OF PRESENT ILLNESS
[9] : 9 [7] : 7 [Retired] : Work status: retired [] : This patient has had an injection before: yes [de-identified] : Here for f/u knees.  [de-identified] : no

## 2025-04-29 NOTE — PROCEDURE
----- Message from Phil Ro Large Joint Injection] : Large joint injection [Bilateral] : bilaterally of the [Knee] : knee [Pain] : pain [Inflammation] : inflammation [X-ray evidence of Osteoarthritis on this or prior visit] : x-ray evidence of Osteoarthritis on this or prior visit [Alcohol] : alcohol [Betadine] : betadine [Ethyl Chloride sprayed topically] : ethyl chloride sprayed topically [Sterile technique used] : sterile technique used [Visco-3 (25mg)] : 25mg of Visco-3 [#1] : series #1 [] : Patient tolerated procedure well [Call if redness, pain or fever occur] : call if redness, pain or fever occur [Apply ice for 15min out of every hour for the next 12-24 hours as tolerated] : apply ice for 15 minutes out of every hour for the next 12-24 hours as tolerated [Previous OTC use and PT nontherapeutic] : patient has tried OTC's including aspirin, Ibuprofen, Aleve, etc or prescription NSAIDS, and/or exercises at home and/or physical therapy without satisfactory response [Patient had decreased mobility in the joint] : patient had decreased mobility in the joint [Risks, benefits, alternatives discussed / Verbal consent obtained] : the risks benefits, and alternatives have been discussed, and verbal consent was obtained [Prior failure or difficult injection] : prior failure or difficult injection [Altered anatomic landmarks d/t erosive arthritis] : altered anatomic landmarks d/t erosive arthritis [All ultrasound images have been permanently captured and stored accordingly in our picture archiving and communication system] : All ultrasound images have been permanently captured and stored accordingly in our picture archiving and communication system

## 2025-05-06 NOTE — HISTORY OF PRESENT ILLNESS
[Retired] : Work status: retired [2] : 2 [Other:____] : [unfilled] [0] : 0 [Localized] : localized [Rest] : rest [de-identified] : Here for f/u knees.  [] : no [FreeTextEntry1] : knees [de-identified] : squats in PT [de-identified] : for the back [de-identified] : 4-29-25 [de-identified] : knees

## 2025-05-06 NOTE — DISCUSSION/SUMMARY
[Medication Risks Reviewed] : Medication risks reviewed [de-identified] : General Dx Discussion The patient was advised of the diagnosis. The natural history of the pathology was explained in full to the patient in layman's terms. All questions were answered. The risks and benefits of surgical and non-surgical treatment alternatives were explained in full to the patient.  Case Discussed. Visco3 injections today b/l knees and tolerated well.  Ice as needed. f/u 1 week to continue series.     Entered by Maryuri RIOS acting as a scribe. Instructions: Dr. Marcus- The documentation recorded by the scribe accurately reflects the service I personally performed and the decisions made by me.

## 2025-05-06 NOTE — PROCEDURE
[Large Joint Injection] : Large joint injection [Bilateral] : bilaterally of the [Knee] : knee [Pain] : pain [Inflammation] : inflammation [X-ray evidence of Osteoarthritis on this or prior visit] : x-ray evidence of Osteoarthritis on this or prior visit [Alcohol] : alcohol [Betadine] : betadine [Ethyl Chloride sprayed topically] : ethyl chloride sprayed topically [Sterile technique used] : sterile technique used [Visco-3 (25mg)] : 25mg of Visco-3 [#2] : series #2 [] : Patient tolerated procedure well [Call if redness, pain or fever occur] : call if redness, pain or fever occur [Apply ice for 15min out of every hour for the next 12-24 hours as tolerated] : apply ice for 15 minutes out of every hour for the next 12-24 hours as tolerated [Previous OTC use and PT nontherapeutic] : patient has tried OTC's including aspirin, Ibuprofen, Aleve, etc or prescription NSAIDS, and/or exercises at home and/or physical therapy without satisfactory response [Patient had decreased mobility in the joint] : patient had decreased mobility in the joint [Risks, benefits, alternatives discussed / Verbal consent obtained] : the risks benefits, and alternatives have been discussed, and verbal consent was obtained [Prior failure or difficult injection] : prior failure or difficult injection [Altered anatomic landmarks d/t erosive arthritis] : altered anatomic landmarks d/t erosive arthritis [All ultrasound images have been permanently captured and stored accordingly in our picture archiving and communication system] : All ultrasound images have been permanently captured and stored accordingly in our picture archiving and communication system

## 2025-05-06 NOTE — PHYSICAL EXAM
[Right] : right knee [Left] : left knee [NL (0)] : extension 0 degrees [5___] : hamstring 5[unfilled]/5 [] : patient ambulates without assistive device [de-identified] : AP instability  [TWNoteComboBox7] : flexion 120 degrees

## 2025-05-13 NOTE — PHYSICAL EXAM
[Right] : right knee [Left] : left knee [NL (0)] : extension 0 degrees [5___] : hamstring 5[unfilled]/5 [] : no erythema [de-identified] : AP instability  [TWNoteComboBox7] : flexion 120 degrees

## 2025-05-13 NOTE — HISTORY OF PRESENT ILLNESS
[3] : 3 [Other:____] : [unfilled] [5] : 5 [Dull/Aching] : dull/aching [Localized] : localized [Tightness] : tightness [Nothing helps with pain getting better] : Nothing helps with pain getting better [de-identified] : Here for f/u knees.  [] : no [FreeTextEntry1] : knees [de-identified] : cannot bend [de-identified] : 5-6-25 [de-identified] : knees

## 2025-05-13 NOTE — DISCUSSION/SUMMARY
[Medication Risks Reviewed] : Medication risks reviewed [de-identified] : General Dx Discussion The patient was advised of the diagnosis. The natural history of the pathology was explained in full to the patient in layman's terms. All questions were answered. The risks and benefits of surgical and non-surgical treatment alternatives were explained in full to the patient.  Case Discussed. Visco3 injections today b/l knees and tolerated well.  Ice as needed. f/u 6 weeks.     Entered by Maryuri RIOS acting as a scribe. Instructions: Dr. Marcus- The documentation recorded by the scribe accurately reflects the service I personally performed and the decisions made by me.

## 2025-05-13 NOTE — PROCEDURE
[Large Joint Injection] : Large joint injection [Bilateral] : bilaterally of the [Knee] : knee [Pain] : pain [Inflammation] : inflammation [X-ray evidence of Osteoarthritis on this or prior visit] : x-ray evidence of Osteoarthritis on this or prior visit [Alcohol] : alcohol [Betadine] : betadine [Ethyl Chloride sprayed topically] : ethyl chloride sprayed topically [Sterile technique used] : sterile technique used [Visco-3 (25mg)] : 25mg of Visco-3 [#3] : series #3 [] : Patient tolerated procedure well [Call if redness, pain or fever occur] : call if redness, pain or fever occur [Apply ice for 15min out of every hour for the next 12-24 hours as tolerated] : apply ice for 15 minutes out of every hour for the next 12-24 hours as tolerated [Previous OTC use and PT nontherapeutic] : patient has tried OTC's including aspirin, Ibuprofen, Aleve, etc or prescription NSAIDS, and/or exercises at home and/or physical therapy without satisfactory response [Patient had decreased mobility in the joint] : patient had decreased mobility in the joint [Risks, benefits, alternatives discussed / Verbal consent obtained] : the risks benefits, and alternatives have been discussed, and verbal consent was obtained [Prior failure or difficult injection] : prior failure or difficult injection [Altered anatomic landmarks d/t erosive arthritis] : altered anatomic landmarks d/t erosive arthritis [All ultrasound images have been permanently captured and stored accordingly in our picture archiving and communication system] : All ultrasound images have been permanently captured and stored accordingly in our picture archiving and communication system

## 2025-06-23 NOTE — HISTORY OF PRESENT ILLNESS
[Lower back] : lower back [5] : 5 [3] : 3 [Dull/Aching] : dull/aching [Constant] : constant [Household chores] : household chores [Leisure] : leisure [Sleep] : sleep [Injection therapy] : injection therapy [Bending forward] : bending forward [FreeTextEntry1] : 2025 - Patient presents for 8-week follow-up visit. Patient continues to report significant pain relief of axial low back pain and a significant increase in functionality s/p BVN ablation. She has completed several PT sessions for the low back and now has continued a HEP with exercises learned daily with benefit. Most bothersome area of pain remains right buttocks and hip.  Pain is located to the buttocks with radiation to the lateral hip and anterior thigh.  Pain is present with stretching, going up and downstairs and with increased activity.  Patient has previously tried and failed GTB and hip joint injections as well as PT dedicated to the right hip. Denies any alarm signs or changes in medical history since last office visit.  Presents today to discuss next steps in her treatment plan with the hope of decreasing her pain and increasing her functionality and quality of life.   2025 - Patient presents today for a FUV after L4-L5 BVN Ablation (Intracept) procedure on 2025, Patient reports overall 80% reduction in axial low back pain since the procedure and she is very pleased with the results at this time.  She reports her sleep has improved significantly as well as her discomfort when transitioning from sit to stand positioning or getting in/out of her car. Patient reports still remaining cautious with bending/lifting and therefore is unable to gauge her relief of symptoms fully at this time.  Patient is looking forward to beginning physical therapy and engaging in physical activity again. Presents today for wound check and to discuss next steps in her treatment plan.    3/10/2025 - Patient presents for FUV after a bilateral L4-5, L5-S1 facet joint MBB on 2025. Patient reports 80% relief of axial low back pain the day of the procedure with a gradual return to baseline the following day.  Patient had a positive response with a diagnostic block for her posterior column low back pain and wishes to proceed with a repeat.  Of note, patient is scheduled for BVN ablation for treatment of anterior column low back pain 25 and is looking forward to it.  Denies any alarm signs or changes in medical history.    2025 - Patient presents for reevaluation. Patient was last seen about 10 months ago.  Since her previous visit she has undergone a neurosurgical evaluation with Dr. John Mir.  Further conservative care was recommended, and she was referred back to our practice.  Patient reports her low back pain and right hip pain remains most bothersome. Denies radicular symptoms or grain pain at this time. Low back pain is present when transitioning from sit to stand and when lifting. Pain wishes to discuss facet injections today vs BVN ablation which was previously denied by her insurance.    3/25/2024 - Patient presents for follow-up visit after right L4-L5, L5-S1 TFESI on 3/8/2024.  She reports a significant reduction of pain s/p epidural (80% overall reduction of pain), her right hip pain has been greatly reduced.  She has started formal PT. Patient still has some residual lower back and right buttock pain.   2024 - Patient presents for FUV after a right L4-5 TFESI on 2024. Patient reports a 75% reduction of pain s/p epidural, her pain is currently at the right buttock, hip and upper thigh, pain quality has dropped from sharp to aching.  Patient reports consideration for BVN ablation has been declined by insurance carrier.  2024 - The patient presents for initial evaluation regarding their low back pain. Patient was referred by Dr. Raymundo. Patient has a long-standing history of chronic lower back pain, since 2018 she has been feeling radicular pain to her hip. Currently her pain is across the lower back with radiation to the right hip and buttocks (Pain Distribution: 65% right hip, 35% lower back). She has tried PT, HEP, previous epidurals and facet blocks with no meaningful benefit; her pain is exacerbated with prolonged sitting, lifting and bending.    Injections: 1) Right L4-5 TFESI (2024) 2) Right L4-L5, L5-S1 TFESI (3/8/2024) 3) right GTB injection (2023) - Dr. Raymundo 4) Bilateral L4-5, L5-S1 facet joint MBB - (2025) 5) L4-L5 BVN Ablation (Intracept procedure) - (2025)  Pertinent Surgical History: N/A   Imagin) MRI Lumbar Spine (12/10/2024) - [facility]  There is degenerative appearing endplate fatty change with Schmorl's nodes at L4-L5 (Modic type II2 change).  There is minimal degenerative appearing endplate fatty change with Schmorl's nodes anteriorly at T12-L1 as well.  Paraspinal soft tissues unremarkable. L1-L2: Central annular fissure with broad-based disc protrusion mildly narrowing the spinal canal. No foramina stenosis L2-L3: Mild disc bulge and facet arthrosis. Left foraminal zone annular fissure with disc protrusion. Mild left foraminal narrowing with impingement of the exiting left L2 nerve root. No spinal canal or right foraminal narrowing. L3-L4: Mild disc bulge. Mild facet arthrosis. No spinal canal or foramina stenosis L4-L5: Disc bulge. Moderate facet arthrosis. Left paracentral annular fissure with broad-based disc protrusion. Mild spinal canal and foraminal narrowing with disc material contacting exiting right L4 nerve root as well as bilateral descending L5 nerve roots L5-S1: Mild facet arthrosis. No disc bulge, spinal canal or foraminal narrowing  Physician Disclaimer: I have personally reviewed and confirmed all HPI data with the patient.  [] : Post Surgical Visit: no [de-identified] : MRI Lumbar Spine (12/10/2024) - [facility]

## 2025-06-23 NOTE — HISTORY OF PRESENT ILLNESS
[Lower back] : lower back [5] : 5 [3] : 3 [Dull/Aching] : dull/aching [Constant] : constant [Household chores] : household chores [Leisure] : leisure [Sleep] : sleep [Injection therapy] : injection therapy [Bending forward] : bending forward [FreeTextEntry1] : 2025 - Patient presents for 8-week follow-up visit. Patient continues to report significant pain relief of axial low back pain and a significant increase in functionality s/p BVN ablation. She has completed several PT sessions for the low back and now has continued a HEP with exercises learned daily with benefit. Most bothersome area of pain remains right buttocks and hip.  Pain is located to the buttocks with radiation to the lateral hip and anterior thigh.  Pain is present with stretching, going up and downstairs and with increased activity.  Patient has previously tried and failed GTB and hip joint injections as well as PT dedicated to the right hip. Denies any alarm signs or changes in medical history since last office visit.  Presents today to discuss next steps in her treatment plan with the hope of decreasing her pain and increasing her functionality and quality of life.   2025 - Patient presents today for a FUV after L4-L5 BVN Ablation (Intracept) procedure on 2025, Patient reports overall 80% reduction in axial low back pain since the procedure and she is very pleased with the results at this time.  She reports her sleep has improved significantly as well as her discomfort when transitioning from sit to stand positioning or getting in/out of her car. Patient reports still remaining cautious with bending/lifting and therefore is unable to gauge her relief of symptoms fully at this time.  Patient is looking forward to beginning physical therapy and engaging in physical activity again. Presents today for wound check and to discuss next steps in her treatment plan.    3/10/2025 - Patient presents for FUV after a bilateral L4-5, L5-S1 facet joint MBB on 2025. Patient reports 80% relief of axial low back pain the day of the procedure with a gradual return to baseline the following day.  Patient had a positive response with a diagnostic block for her posterior column low back pain and wishes to proceed with a repeat.  Of note, patient is scheduled for BVN ablation for treatment of anterior column low back pain 25 and is looking forward to it.  Denies any alarm signs or changes in medical history.    2025 - Patient presents for reevaluation. Patient was last seen about 10 months ago.  Since her previous visit she has undergone a neurosurgical evaluation with Dr. John Mir.  Further conservative care was recommended, and she was referred back to our practice.  Patient reports her low back pain and right hip pain remains most bothersome. Denies radicular symptoms or grain pain at this time. Low back pain is present when transitioning from sit to stand and when lifting. Pain wishes to discuss facet injections today vs BVN ablation which was previously denied by her insurance.    3/25/2024 - Patient presents for follow-up visit after right L4-L5, L5-S1 TFESI on 3/8/2024.  She reports a significant reduction of pain s/p epidural (80% overall reduction of pain), her right hip pain has been greatly reduced.  She has started formal PT. Patient still has some residual lower back and right buttock pain.   2024 - Patient presents for FUV after a right L4-5 TFESI on 2024. Patient reports a 75% reduction of pain s/p epidural, her pain is currently at the right buttock, hip and upper thigh, pain quality has dropped from sharp to aching.  Patient reports consideration for BVN ablation has been declined by insurance carrier.  2024 - The patient presents for initial evaluation regarding their low back pain. Patient was referred by Dr. Raymundo. Patient has a long-standing history of chronic lower back pain, since 2018 she has been feeling radicular pain to her hip. Currently her pain is across the lower back with radiation to the right hip and buttocks (Pain Distribution: 65% right hip, 35% lower back). She has tried PT, HEP, previous epidurals and facet blocks with no meaningful benefit; her pain is exacerbated with prolonged sitting, lifting and bending.    Injections: 1) Right L4-5 TFESI (2024) 2) Right L4-L5, L5-S1 TFESI (3/8/2024) 3) right GTB injection (2023) - Dr. Raymundo 4) Bilateral L4-5, L5-S1 facet joint MBB - (2025) 5) L4-L5 BVN Ablation (Intracept procedure) - (2025)  Pertinent Surgical History: N/A   Imagin) MRI Lumbar Spine (12/10/2024) - [facility]  There is degenerative appearing endplate fatty change with Schmorl's nodes at L4-L5 (Modic type II2 change).  There is minimal degenerative appearing endplate fatty change with Schmorl's nodes anteriorly at T12-L1 as well.  Paraspinal soft tissues unremarkable. L1-L2: Central annular fissure with broad-based disc protrusion mildly narrowing the spinal canal. No foramina stenosis L2-L3: Mild disc bulge and facet arthrosis. Left foraminal zone annular fissure with disc protrusion. Mild left foraminal narrowing with impingement of the exiting left L2 nerve root. No spinal canal or right foraminal narrowing. L3-L4: Mild disc bulge. Mild facet arthrosis. No spinal canal or foramina stenosis L4-L5: Disc bulge. Moderate facet arthrosis. Left paracentral annular fissure with broad-based disc protrusion. Mild spinal canal and foraminal narrowing with disc material contacting exiting right L4 nerve root as well as bilateral descending L5 nerve roots L5-S1: Mild facet arthrosis. No disc bulge, spinal canal or foraminal narrowing  Physician Disclaimer: I have personally reviewed and confirmed all HPI data with the patient.  [] : Post Surgical Visit: no [de-identified] : MRI Lumbar Spine (12/10/2024) - [facility]

## 2025-06-23 NOTE — PHYSICAL EXAM
[de-identified] : Constitutional:   - No acute distress   - Well developed; well nourished    Neurological:   - normal mood and affect   - alert and oriented x 3     Cardiovascular:   - grossly normal   Lumbar Spine Exam:   Inspection:  erythema (-)  ecchymosis (-)  rashes (-)  alignment: no scoliosis  BVN ablation trocar site CDI without redness, edema or exudate  Palpation:  Midline lumbar tenderness:            (-)  midline thoracic tenderness:          (-)  Lumbar paraspinal tenderness:  L (-) ; R (-)  thoracic paraspinal tenderness: L (-) ; R (-)  sciatic nerve tenderness :          L (-) ; R (-)  SI joint tenderness:                     L (-) ; R (+)  GTB tenderness:                        L (-);  R (-)   Issac's Test:         R (+); L (-)    Philipp Finger test:   R (+); L (-)  Thigh Thrust:          R (-); L (-)  Gaenslen's test:      R (-); L (-)  Compression test:   R (+); L (-)  ROM: WNL  pain with extremes of flexion > extension  Strength:                                     Right       Left     Hip Flexion:                (5/5)       (5/5)  Quadriceps:               (5/5)       (5/5)  Hamstrings:                (5/5)       (5/5)  Ankle Dorsiflexion:     (5/5)       (5/5)  EHL:                           (5/5)       (5/5)  Ankle Plantarflexion:  (5/5)       (5/5)   Special Tests:  SLR:                            R (-) ; L (-)  Facet loading:             R (-) ; L (-)  DANIELLA test:                R (n/a) ; L (n/a)  Hamstring tightness:   R (-);  L (-)   Neurologic:  SILT throughout right lower extremity  SILT throughout left lower extremity   Reflexes normal and symmetric bilateral lower extremities   Gait:  non- antalgic gait

## 2025-06-23 NOTE — REVIEW OF SYSTEMS
[Joint Pain] : joint pain [Joint Stiffness] : joint stiffness [Feeling Weak] : feeling weak [Muscle Weakness] : muscle weakness [Negative] : Heme/Lymph

## 2025-06-23 NOTE — ASSESSMENT
[FreeTextEntry1] : A discussion regarding available pain management treatment options occurred with the patient.  These included interventional, rehabilitative, pharmacological, and alternative modalities. We will proceed with the following:    Interventional treatment options:   - Proceed with right SIJ CSI with fluoroscopic guidance - Discussed diagnostic vs. potentially therapeutic role of indicated procedure - May proceed with repeat bilateral L4-L5, L5-S1 facet joint MBB with ongoing axial low back pain - Can consider repeat right L4-L5, L5-S1 TFESI with return of severe radicular pain - Failed right GTB injection with orthopedics - see additional instructions below    Rehabilitative options:   - Completed physical therapy; defers further at this time - participation in active HEP was discussed    Medication based treatment options:   - continue OTC NSAIDs on as-needed basis - see additional instructions below    Complementary treatment options:   - lifestyle modifications discussed  Additional treatment recommendations as follows: - Follow up 1-2 weeks post injection for assessment of efficacy and further treatment recommendations  The risks, benefits and alternatives of the proposed procedure were explained in detail with the patient. The risks outlined include but are not limited to infection, bleeding, post- dural puncture headache, nerve injury, a temporary increase in pain, failure to resolve symptoms, need for future interventions, allergic reaction, and possible elevation of blood sugar in diabetics if using corticosteroid.  All questions were answered to patient's apparent satisfaction, and he/she verbalized an understanding.  We have discussed the risks, benefits, and alternatives for NSAID therapy including but not limited to the risk of bleeding, thrombosis, gastric mucosal irritation/ulceration, allergic reaction and kidney dysfunction.  The patient verbalizes an understanding.  The documentation recorded by the scribe, in my presence, accurately reflects the service I personally performed and the decisions made by me with my edits as appropriate.   I, Heidy Patrick NP, acting as scribe, attest that this documentation has been prepared under the direction and in the presence of Provider Theron Montero DO.

## 2025-06-23 NOTE — PHYSICAL EXAM
[de-identified] : Constitutional:   - No acute distress   - Well developed; well nourished    Neurological:   - normal mood and affect   - alert and oriented x 3     Cardiovascular:   - grossly normal   Lumbar Spine Exam:   Inspection:  erythema (-)  ecchymosis (-)  rashes (-)  alignment: no scoliosis  BVN ablation trocar site CDI without redness, edema or exudate  Palpation:  Midline lumbar tenderness:            (-)  midline thoracic tenderness:          (-)  Lumbar paraspinal tenderness:  L (-) ; R (-)  thoracic paraspinal tenderness: L (-) ; R (-)  sciatic nerve tenderness :          L (-) ; R (-)  SI joint tenderness:                     L (-) ; R (+)  GTB tenderness:                        L (-);  R (-)   Issac's Test:         R (+); L (-)    Philipp Finger test:   R (+); L (-)  Thigh Thrust:          R (-); L (-)  Gaenslen's test:      R (-); L (-)  Compression test:   R (+); L (-)  ROM: WNL  pain with extremes of flexion > extension  Strength:                                     Right       Left     Hip Flexion:                (5/5)       (5/5)  Quadriceps:               (5/5)       (5/5)  Hamstrings:                (5/5)       (5/5)  Ankle Dorsiflexion:     (5/5)       (5/5)  EHL:                           (5/5)       (5/5)  Ankle Plantarflexion:  (5/5)       (5/5)   Special Tests:  SLR:                            R (-) ; L (-)  Facet loading:             R (-) ; L (-)  DANIELLA test:                R (n/a) ; L (n/a)  Hamstring tightness:   R (-);  L (-)   Neurologic:  SILT throughout right lower extremity  SILT throughout left lower extremity   Reflexes normal and symmetric bilateral lower extremities   Gait:  non- antalgic gait

## 2025-07-11 NOTE — PROCEDURE
[FreeTextEntry3] : Date of Service: 07/11/2025   Account: [id number]  Patient: AMBER SWEET   YOB: 1972  Age: 52 years  Surgeon:      Theron Montero DO  Pre - Operative Diagnosis:       Right sacroiliac joint dysfunction         Post - Operative Diagnosis:     Same              Procedure:           Right Sacroiliac arthrogram and joint injection under fluoroscopic guidance  This procedure was carried out using fluoroscopic guidance.  The risks and benefits of the procedure were discussed extensively with the patient.  The consent of the patient was obtained and the following procedure was performed. A timeout was performed with all essential staff present and the site and side were verified.  The patient was placed in the prone position.  The patient's back was prepped and draped in a sterile fashion.  The fluoroscopic image intensifier was then positioned clifton maximize visualization of the joint.  This was achieved with slight cephalad and medial angulation.   The area corresponding to the right inferior SIJ space was then identified and marked. The skin and subcutaneous structures were then anesthetized using 1 cc of 1% lidocaine.  A 22-gauge 3.5-inch spinal needle was then inserted and directed into the right sacroiliac joint space using fluoroscopic guidance.  After negative aspiration for heme and CSF, 2 cc of Omnipaque was injected and appeared to fill the joint space.  An injectate of 2.5 cc 0.25% Marcaine plus 40 mg of Kenalog was then injected into the right sacroiliac joint space.  Vital signs remained normal throughout the procedure.  The patient tolerated the procedure well.  There were no immediate complications from the performed procedure.  The patient was instructed to apply ice over the injection sites for twenty minutes every two hours for the next 24 hours.  Disposition:       1. The patient was advised to F/U in 1-2 weeks to assess the response to the injection.       2. The patient was also instructed to contact me immediately if there were any concerns related to the procedure performed.

## 2025-07-29 NOTE — HISTORY OF PRESENT ILLNESS
[Lower back] : lower back [5] : 5 [3] : 3 [Dull/Aching] : dull/aching [Constant] : constant [Household chores] : household chores [Leisure] : leisure [Sleep] : sleep [Ice] : ice [Heat] : heat [Injection therapy] : injection therapy [Sitting] : sitting [FreeTextEntry1] : 2025 - Patient presents for a follow-up after Right SIJ CSI on 2025. Patient reports a 25% improvement predominantly in right buttock and hip pain following the injection.  Current pain still predominantly in the right buttocks with radiation to the right lateral hip.  She denies any radiating pain into the lower extremity per se.  Patient reports episodic lower back spasms following the most recent SIJ injection. She reports using Motrin as needed after a back spasm.  Patient has completed formal physical therapy in the past.  She has been participating in active HEP.  2025 - Patient presents for 8-week follow-up visit. Patient continues to report significant pain relief of axial low back pain and a significant increase in functionality s/p BVN ablation. She has completed several PT sessions for the low back and now has continued a HEP with exercises learned daily with benefit. Most bothersome area of pain remains right buttocks and hip.  Pain is located to the buttocks with radiation to the lateral hip and anterior thigh.  Pain is present with stretching, going up and downstairs and with increased activity.  Patient has previously tried and failed GTB and hip joint injections as well as PT dedicated to the right hip. Denies any alarm signs or changes in medical history since last office visit.  Presents today to discuss next steps in her treatment plan with the hope of decreasing her pain and increasing her functionality and quality of life.   2025 - Patient presents today for a FUV after L4-L5 BVN Ablation (Intracept) procedure on 2025, Patient reports overall 80% reduction in axial low back pain since the procedure and she is very pleased with the results at this time.  She reports her sleep has improved significantly as well as her discomfort when transitioning from sit to stand positioning or getting in/out of her car. Patient reports still remaining cautious with bending/lifting and therefore is unable to gauge her relief of symptoms fully at this time.  Patient is looking forward to beginning physical therapy and engaging in physical activity again. Presents today for wound check and to discuss next steps in her treatment plan.    3/10/2025 - Patient presents for FUV after a bilateral L4-5, L5-S1 facet joint MBB on 2025. Patient reports 80% relief of axial low back pain the day of the procedure with a gradual return to baseline the following day.  Patient had a positive response with a diagnostic block for her posterior column low back pain and wishes to proceed with a repeat.  Of note, patient is scheduled for BVN ablation for treatment of anterior column low back pain 25 and is looking forward to it.  Denies any alarm signs or changes in medical history.    2025 - Patient presents for reevaluation. Patient was last seen about 10 months ago.  Since her previous visit she has undergone a neurosurgical evaluation with Dr. John Mir.  Further conservative care was recommended, and she was referred back to our practice.  Patient reports her low back pain and right hip pain remains most bothersome. Denies radicular symptoms or grain pain at this time. Low back pain is present when transitioning from sit to stand and when lifting. Pain wishes to discuss facet injections today vs BVN ablation which was previously denied by her insurance.    3/25/2024 - Patient presents for follow-up visit after right L4-L5, L5-S1 TFESI on 3/8/2024.  She reports a significant reduction of pain s/p epidural (80% overall reduction of pain), her right hip pain has been greatly reduced.  She has started formal PT. Patient still has some residual lower back and right buttock pain.   2024 - Patient presents for FUV after a right L4-5 TFESI on 2024. Patient reports a 75% reduction of pain s/p epidural, her pain is currently at the right buttock, hip and upper thigh, pain quality has dropped from sharp to aching.  Patient reports consideration for BVN ablation has been declined by insurance carrier.  2024 - The patient presents for initial evaluation regarding their low back pain. Patient was referred by Dr. Raymundo. Patient has a long-standing history of chronic lower back pain, since 2018 she has been feeling radicular pain to her hip. Currently her pain is across the lower back with radiation to the right hip and buttocks (Pain Distribution: 65% right hip, 35% lower back). She has tried PT, HEP, previous epidurals and facet blocks with no meaningful benefit; her pain is exacerbated with prolonged sitting, lifting and bending.    Injections: 1) Right L4-5 TFESI (2024) 2) Right L4-L5, L5-S1 TFESI (3/8/2024) 3) right GTB injection (2023) - Dr. Raymundo 4) Bilateral L4-5, L5-S1 facet joint MBB - (2025) 5) L4-L5 BVN Ablation (Intracept procedure) - (2025) 6) Right SIJ CSI (2025)  Pertinent Surgical History: N/A   Imagin) MRI Lumbar Spine (12/10/2024) - [facility]  There is degenerative appearing endplate fatty change with Schmorl's nodes at L4-L5 (Modic type II2 change).  There is minimal degenerative appearing endplate fatty change with Schmorl's nodes anteriorly at T12-L1 as well.  Paraspinal soft tissues unremarkable. L1-L2: Central annular fissure with broad-based disc protrusion mildly narrowing the spinal canal. No foramina stenosis L2-L3: Mild disc bulge and facet arthrosis. Left foraminal zone annular fissure with disc protrusion. Mild left foraminal narrowing with impingement of the exiting left L2 nerve root. No spinal canal or right foraminal narrowing. L3-L4: Mild disc bulge. Mild facet arthrosis. No spinal canal or foramina stenosis L4-L5: Disc bulge. Moderate facet arthrosis. Left paracentral annular fissure with broad-based disc protrusion. Mild spinal canal and foraminal narrowing with disc material contacting exiting right L4 nerve root as well as bilateral descending L5 nerve roots L5-S1: Mild facet arthrosis. No disc bulge, spinal canal or foraminal narrowing  Physician Disclaimer: I have personally reviewed and confirmed all HPI data with the patient.  [] : Post Surgical Visit: no [FreeTextEntry7] : rt hip  [de-identified] : MRI Lumbar Spine (12/10/2024) - [facility]

## 2025-07-29 NOTE — ASSESSMENT
[FreeTextEntry1] : A discussion regarding available pain management treatment options occurred with the patient.  These included interventional, rehabilitative, pharmacological, and alternative modalities. We will proceed with the following:    Interventional treatment options:   - None indicated present time - Would likely proceed with repeat bilateral L4-L5, L5-S1 facet joint MBB with ongoing axial low back pain - Can consider repeat right L4-L5, L5-S1 TFESI with return of severe radicular pain - Consider retrial right GTB CSI for ongoing focal right lateral hip pain - see additional instructions below    Rehabilitative options:   - Completed physical therapy; defers further at this time - participation in active HEP was discussed and encouraged as tolerated - Home exercise sheet provided  Medication based treatment options:   - continue OTC NSAIDs on as-needed basis - see additional instructions below    Complementary treatment options:   - lifestyle modifications discussed  Additional treatment recommendations as follows: - Follow up in 6-8 weeks to assess ongoing response  We have discussed the risks, benefits, and alternatives for NSAID therapy including but not limited to the risk of bleeding, thrombosis, gastric mucosal irritation/ulceration, allergic reaction and kidney dysfunction.  The patient was counseled to utilize NSAIDs concurrently with food and/or a PPI if applicable.  They were counseled to use the lowest effective dose for the shortest duration. The patient verbalizes an understanding.  The documentation recorded by the scribe, in my presence, accurately reflects the service I personally performed and the decisions made by me with my edits as appropriate.   I, China Mendez, acting as scribe, attest that this documentation has been prepared under the direction and in the presence of provider Theron Montero DO.

## 2025-07-29 NOTE — REASON FOR VISIT
Patient attended Phase 2 Cardiac Rehab Exercise Session. Further documentation will be completed in [facility] [facility] and will be scanned into the medical record upon discharge.     [Follow-Up Visit] : a follow-up pain management visit [FreeTextEntry2] : Low back/right hip pain

## 2025-07-29 NOTE — HISTORY OF PRESENT ILLNESS
[Lower back] : lower back [5] : 5 [3] : 3 [Dull/Aching] : dull/aching [Constant] : constant [Household chores] : household chores [Leisure] : leisure [Sleep] : sleep [Ice] : ice [Heat] : heat [Injection therapy] : injection therapy [Sitting] : sitting [FreeTextEntry1] : 2025 - Patient presents for a follow-up after Right SIJ CSI on 2025. Patient reports a 25% improvement predominantly in right buttock and hip pain following the injection.  Current pain still predominantly in the right buttocks with radiation to the right lateral hip.  She denies any radiating pain into the lower extremity per se.  Patient reports episodic lower back spasms following the most recent SIJ injection. She reports using Motrin as needed after a back spasm.  Patient has completed formal physical therapy in the past.  She has been participating in active HEP.  2025 - Patient presents for 8-week follow-up visit. Patient continues to report significant pain relief of axial low back pain and a significant increase in functionality s/p BVN ablation. She has completed several PT sessions for the low back and now has continued a HEP with exercises learned daily with benefit. Most bothersome area of pain remains right buttocks and hip.  Pain is located to the buttocks with radiation to the lateral hip and anterior thigh.  Pain is present with stretching, going up and downstairs and with increased activity.  Patient has previously tried and failed GTB and hip joint injections as well as PT dedicated to the right hip. Denies any alarm signs or changes in medical history since last office visit.  Presents today to discuss next steps in her treatment plan with the hope of decreasing her pain and increasing her functionality and quality of life.   2025 - Patient presents today for a FUV after L4-L5 BVN Ablation (Intracept) procedure on 2025, Patient reports overall 80% reduction in axial low back pain since the procedure and she is very pleased with the results at this time.  She reports her sleep has improved significantly as well as her discomfort when transitioning from sit to stand positioning or getting in/out of her car. Patient reports still remaining cautious with bending/lifting and therefore is unable to gauge her relief of symptoms fully at this time.  Patient is looking forward to beginning physical therapy and engaging in physical activity again. Presents today for wound check and to discuss next steps in her treatment plan.    3/10/2025 - Patient presents for FUV after a bilateral L4-5, L5-S1 facet joint MBB on 2025. Patient reports 80% relief of axial low back pain the day of the procedure with a gradual return to baseline the following day.  Patient had a positive response with a diagnostic block for her posterior column low back pain and wishes to proceed with a repeat.  Of note, patient is scheduled for BVN ablation for treatment of anterior column low back pain 25 and is looking forward to it.  Denies any alarm signs or changes in medical history.    2025 - Patient presents for reevaluation. Patient was last seen about 10 months ago.  Since her previous visit she has undergone a neurosurgical evaluation with Dr. John Mir.  Further conservative care was recommended, and she was referred back to our practice.  Patient reports her low back pain and right hip pain remains most bothersome. Denies radicular symptoms or grain pain at this time. Low back pain is present when transitioning from sit to stand and when lifting. Pain wishes to discuss facet injections today vs BVN ablation which was previously denied by her insurance.    3/25/2024 - Patient presents for follow-up visit after right L4-L5, L5-S1 TFESI on 3/8/2024.  She reports a significant reduction of pain s/p epidural (80% overall reduction of pain), her right hip pain has been greatly reduced.  She has started formal PT. Patient still has some residual lower back and right buttock pain.   2024 - Patient presents for FUV after a right L4-5 TFESI on 2024. Patient reports a 75% reduction of pain s/p epidural, her pain is currently at the right buttock, hip and upper thigh, pain quality has dropped from sharp to aching.  Patient reports consideration for BVN ablation has been declined by insurance carrier.  2024 - The patient presents for initial evaluation regarding their low back pain. Patient was referred by Dr. Raymundo. Patient has a long-standing history of chronic lower back pain, since 2018 she has been feeling radicular pain to her hip. Currently her pain is across the lower back with radiation to the right hip and buttocks (Pain Distribution: 65% right hip, 35% lower back). She has tried PT, HEP, previous epidurals and facet blocks with no meaningful benefit; her pain is exacerbated with prolonged sitting, lifting and bending.    Injections: 1) Right L4-5 TFESI (2024) 2) Right L4-L5, L5-S1 TFESI (3/8/2024) 3) right GTB injection (2023) - Dr. Raymundo 4) Bilateral L4-5, L5-S1 facet joint MBB - (2025) 5) L4-L5 BVN Ablation (Intracept procedure) - (2025) 6) Right SIJ CSI (2025)  Pertinent Surgical History: N/A   Imagin) MRI Lumbar Spine (12/10/2024) - [facility]  There is degenerative appearing endplate fatty change with Schmorl's nodes at L4-L5 (Modic type II2 change).  There is minimal degenerative appearing endplate fatty change with Schmorl's nodes anteriorly at T12-L1 as well.  Paraspinal soft tissues unremarkable. L1-L2: Central annular fissure with broad-based disc protrusion mildly narrowing the spinal canal. No foramina stenosis L2-L3: Mild disc bulge and facet arthrosis. Left foraminal zone annular fissure with disc protrusion. Mild left foraminal narrowing with impingement of the exiting left L2 nerve root. No spinal canal or right foraminal narrowing. L3-L4: Mild disc bulge. Mild facet arthrosis. No spinal canal or foramina stenosis L4-L5: Disc bulge. Moderate facet arthrosis. Left paracentral annular fissure with broad-based disc protrusion. Mild spinal canal and foraminal narrowing with disc material contacting exiting right L4 nerve root as well as bilateral descending L5 nerve roots L5-S1: Mild facet arthrosis. No disc bulge, spinal canal or foraminal narrowing  Physician Disclaimer: I have personally reviewed and confirmed all HPI data with the patient.  [] : Post Surgical Visit: no [FreeTextEntry7] : rt hip  [de-identified] : MRI Lumbar Spine (12/10/2024) - [facility]

## 2025-07-29 NOTE — PHYSICAL EXAM
[de-identified] : Constitutional:   - No acute distress   - Well developed; well nourished    Neurological:   - normal mood and affect   - alert and oriented x 3     Cardiovascular:   - grossly normal   Lumbar Spine Exam:   Inspection:  erythema (-)  ecchymosis (-)  rashes (-)  alignment: no scoliosis  Palpation:  Midline lumbar tenderness:            (-)  midline thoracic tenderness:          (-)  Lumbar paraspinal tenderness:  L (-) ; R (-)  thoracic paraspinal tenderness: L (-) ; R (-)  sciatic nerve tenderness :          L (-) ; R (-)  SI joint tenderness:                     L (-) ; R (-)  GTB tenderness:                        L (-);  R (+)   Issac's Test:         R (=); L (-)    Philipp Finger test:   R (-); L (-)  Thigh Thrust:          R (-); L (-)  Gaenslen's test:      R (-); L (-)  Compression test:   R (-); L (-)  ROM: WNL  pain with extremes of extension  Strength:                                     Right       Left     Hip Flexion:                (5/5)       (5/5)  Quadriceps:               (5/5)       (5/5)  Hamstrings:                (5/5)       (5/5)  Ankle Dorsiflexion:     (5/5)       (5/5)  EHL:                           (5/5)       (5/5)  Ankle Plantarflexion:  (5/5)       (5/5)   Special Tests:  SLR:                            R (-) ; L (-)  Facet loading:             R (+) ; L (+)  DANIELLA test:                R (n/a) ; L (n/a)  Hamstring tightness:   R (-);  L (-)   Neurologic:  SILT throughout right lower extremity  SILT throughout left lower extremity   Reflexes normal and symmetric bilateral lower extremities   Gait:  non- antalgic gait

## 2025-07-29 NOTE — PHYSICAL EXAM
[de-identified] : Constitutional:   - No acute distress   - Well developed; well nourished    Neurological:   - normal mood and affect   - alert and oriented x 3     Cardiovascular:   - grossly normal   Lumbar Spine Exam:   Inspection:  erythema (-)  ecchymosis (-)  rashes (-)  alignment: no scoliosis  Palpation:  Midline lumbar tenderness:            (-)  midline thoracic tenderness:          (-)  Lumbar paraspinal tenderness:  L (-) ; R (-)  thoracic paraspinal tenderness: L (-) ; R (-)  sciatic nerve tenderness :          L (-) ; R (-)  SI joint tenderness:                     L (-) ; R (-)  GTB tenderness:                        L (-);  R (+)   Issac's Test:         R (=); L (-)    Philipp Finger test:   R (-); L (-)  Thigh Thrust:          R (-); L (-)  Gaenslen's test:      R (-); L (-)  Compression test:   R (-); L (-)  ROM: WNL  pain with extremes of extension  Strength:                                     Right       Left     Hip Flexion:                (5/5)       (5/5)  Quadriceps:               (5/5)       (5/5)  Hamstrings:                (5/5)       (5/5)  Ankle Dorsiflexion:     (5/5)       (5/5)  EHL:                           (5/5)       (5/5)  Ankle Plantarflexion:  (5/5)       (5/5)   Special Tests:  SLR:                            R (-) ; L (-)  Facet loading:             R (+) ; L (+)  DANIELLA test:                R (n/a) ; L (n/a)  Hamstring tightness:   R (-);  L (-)   Neurologic:  SILT throughout right lower extremity  SILT throughout left lower extremity   Reflexes normal and symmetric bilateral lower extremities   Gait:  non- antalgic gait